# Patient Record
Sex: FEMALE | Race: WHITE | NOT HISPANIC OR LATINO | Employment: FULL TIME | URBAN - METROPOLITAN AREA
[De-identification: names, ages, dates, MRNs, and addresses within clinical notes are randomized per-mention and may not be internally consistent; named-entity substitution may affect disease eponyms.]

---

## 2023-11-13 ENCOUNTER — OFFICE VISIT (OUTPATIENT)
Dept: PRIMARY CARE | Facility: CLINIC | Age: 53
End: 2023-11-13
Payer: COMMERCIAL

## 2023-11-13 VITALS
HEIGHT: 69 IN | BODY MASS INDEX: 38.8 KG/M2 | OXYGEN SATURATION: 94 % | SYSTOLIC BLOOD PRESSURE: 136 MMHG | TEMPERATURE: 97.2 F | DIASTOLIC BLOOD PRESSURE: 72 MMHG | WEIGHT: 262 LBS | HEART RATE: 93 BPM

## 2023-11-13 DIAGNOSIS — H66.003 NON-RECURRENT ACUTE SUPPURATIVE OTITIS MEDIA OF BOTH EARS WITHOUT SPONTANEOUS RUPTURE OF TYMPANIC MEMBRANES: Primary | ICD-10-CM

## 2023-11-13 DIAGNOSIS — R05.1 ACUTE COUGH: ICD-10-CM

## 2023-11-13 PROCEDURE — 99213 OFFICE O/P EST LOW 20 MIN: CPT

## 2023-11-13 PROCEDURE — 1036F TOBACCO NON-USER: CPT

## 2023-11-13 RX ORDER — LOSARTAN POTASSIUM AND HYDROCHLOROTHIAZIDE 12.5; 5 MG/1; MG/1
TABLET ORAL EVERY 24 HOURS
COMMUNITY
End: 2024-02-16 | Stop reason: SDUPTHER

## 2023-11-13 RX ORDER — BENZONATATE 100 MG/1
100 CAPSULE ORAL 3 TIMES DAILY PRN
Qty: 30 CAPSULE | Refills: 0 | Status: SHIPPED | OUTPATIENT
Start: 2023-11-13 | End: 2023-11-23

## 2023-11-13 RX ORDER — EZETIMIBE AND SIMVASTATIN 10; 40 MG/1; MG/1
TABLET ORAL EVERY 24 HOURS
COMMUNITY
Start: 2023-02-15 | End: 2024-02-16 | Stop reason: SDUPTHER

## 2023-11-13 RX ORDER — METFORMIN HYDROCHLORIDE 500 MG/1
TABLET ORAL
COMMUNITY
Start: 2023-02-15 | End: 2024-02-16 | Stop reason: SDUPTHER

## 2023-11-13 RX ORDER — DOXYCYCLINE 100 MG/1
100 CAPSULE ORAL 2 TIMES DAILY
Qty: 14 CAPSULE | Refills: 0 | Status: SHIPPED | OUTPATIENT
Start: 2023-11-13 | End: 2023-11-20

## 2023-11-13 RX ORDER — PIOGLITAZONEHYDROCHLORIDE 45 MG/1
TABLET ORAL EVERY 24 HOURS
COMMUNITY
Start: 2023-02-15 | End: 2024-02-16 | Stop reason: SDUPTHER

## 2023-11-13 RX ORDER — DULAGLUTIDE 1.5 MG/.5ML
3 INJECTION, SOLUTION SUBCUTANEOUS
COMMUNITY
Start: 2022-10-27 | End: 2024-02-16 | Stop reason: SDUPTHER

## 2023-11-13 ASSESSMENT — ENCOUNTER SYMPTOMS
ABDOMINAL PAIN: 0
DIARRHEA: 0
MYALGIAS: 0
RHINORRHEA: 1
HEMOPTYSIS: 0
FEVER: 0
NECK PAIN: 0
HEARTBURN: 0
VOMITING: 0
SORE THROAT: 0
WEIGHT LOSS: 0
SHORTNESS OF BREATH: 0
WHEEZING: 0
COUGH: 1
CHILLS: 0
SWEATS: 0
HEADACHES: 1

## 2023-11-13 ASSESSMENT — PAIN SCALES - GENERAL: PAINLEVEL: 4

## 2023-11-13 ASSESSMENT — COPD QUESTIONNAIRES: COPD: 0

## 2023-11-13 NOTE — PROGRESS NOTES
Subjective   Patient ID: Elyse Dawson is a 53 y.o. female who presents for Ear Fullness (X 2 days), Cough, and Hoarseness.    Ear Fullness   There is pain in both ears. This is a new problem. Episode onset: 2-3 days. The problem occurs constantly. The problem has been unchanged. There has been no fever. The pain is at a severity of 5/10. The pain is moderate. Associated symptoms include coughing, headaches, hearing loss and rhinorrhea. Pertinent negatives include no abdominal pain, diarrhea, ear discharge, neck pain, rash, sore throat or vomiting. She has tried acetaminophen (mucinex) for the symptoms. The treatment provided mild relief. Her past medical history is significant for a chronic ear infection and a tympanostomy tube. There is no history of hearing loss.   Cough  This is a new problem. Episode onset: progressive over the last several days. The problem has been unchanged. The problem occurs every few minutes. The cough is Non-productive. Associated symptoms include ear congestion, ear pain, headaches, nasal congestion and rhinorrhea. Pertinent negatives include no chest pain, chills, fever, heartburn, hemoptysis, myalgias, postnasal drip, rash, sore throat, shortness of breath, sweats, weight loss or wheezing. Nothing aggravates the symptoms. Treatments tried: mucinex. The treatment provided mild relief. There is no history of asthma, bronchiectasis, bronchitis, COPD, emphysema, environmental allergies or pneumonia.            Review of Systems   Constitutional:  Negative for chills, fever and weight loss.   HENT:  Positive for ear pain, hearing loss and rhinorrhea. Negative for ear discharge, postnasal drip and sore throat.    Respiratory:  Positive for cough. Negative for hemoptysis, shortness of breath and wheezing.    Cardiovascular:  Negative for chest pain.   Gastrointestinal:  Negative for abdominal pain, diarrhea, heartburn and vomiting.   Musculoskeletal:  Negative for myalgias and neck pain.  "  Skin:  Negative for rash.   Allergic/Immunologic: Negative for environmental allergies.   Neurological:  Positive for headaches.       Objective   Blood Pressure 136/72 (BP Location: Left arm)   Pulse 93   Temperature 36.2 °C (97.2 °F) (Temporal)   Height 1.753 m (5' 9\")   Weight 119 kg (262 lb)   Oxygen Saturation 94%   Body Mass Index 38.69 kg/m²     Physical Exam  Vitals and nursing note reviewed.   Constitutional:       General: She is not in acute distress.     Appearance: Normal appearance. She is not ill-appearing or toxic-appearing.   HENT:      Right Ear: External ear normal. Decreased hearing noted. Tympanic membrane is erythematous and bulging.      Left Ear: External ear normal. Decreased hearing noted. Tympanic membrane is erythematous and bulging.      Nose: Nasal tenderness, mucosal edema, congestion and rhinorrhea present. Rhinorrhea is clear.      Right Turbinates: Swollen.      Left Turbinates: Swollen.      Right Sinus: Maxillary sinus tenderness and frontal sinus tenderness present.      Left Sinus: Maxillary sinus tenderness and frontal sinus tenderness present.      Mouth/Throat:      Lips: Pink.      Mouth: Mucous membranes are moist.      Pharynx: Oropharynx is clear. Uvula midline. Posterior oropharyngeal erythema present. No pharyngeal swelling, oropharyngeal exudate or uvula swelling.   Neck:      Trachea: Trachea and phonation normal.   Cardiovascular:      Rate and Rhythm: Normal rate and regular rhythm.      Heart sounds: Normal heart sounds, S1 normal and S2 normal.   Pulmonary:      Effort: Pulmonary effort is normal.      Breath sounds: Normal breath sounds and air entry. No decreased breath sounds, wheezing, rhonchi or rales.   Musculoskeletal:      Cervical back: Full passive range of motion without pain, normal range of motion and neck supple.   Lymphadenopathy:      Cervical: No cervical adenopathy.   Skin:     General: Skin is warm and dry.   Neurological:      General: " No focal deficit present.      Mental Status: She is alert.   Psychiatric:         Behavior: Behavior is cooperative.               Assessment/Plan   Problem List Items Addressed This Visit    None  Visit Diagnoses       Diagnosis Codes    Non-recurrent acute suppurative otitis media of both ears without spontaneous rupture of tympanic membranes    -  Primary H66.003    Relevant Medications    doxycycline (Vibramycin) 100 mg capsule    Start antibiotics as directed and finish full course.  Reviewed risks, side effects, and expected treatment course.  May use OTC tylenol/ibuprofen, nasal or oral decongestants for symptom control as discussed.   May use saline nasal spray as discussed to thin mucus and relieve nasal symptoms.  Call if not rapidly improving over the next 5-7 days.      Acute cough     R05.1    Relevant Medications    benzonatate (Tessalon) 100 mg capsule    Acute. Begin tessalon perles as prescribed.  Indications, administration, and side effects discussed.    May continue with OTC medications for symptom relief as discussed.   Follow up in 1 week if symptoms persist.

## 2023-12-05 ENCOUNTER — OFFICE VISIT (OUTPATIENT)
Dept: PRIMARY CARE | Facility: CLINIC | Age: 53
End: 2023-12-05
Payer: COMMERCIAL

## 2023-12-05 VITALS
OXYGEN SATURATION: 97 % | TEMPERATURE: 97.2 F | HEART RATE: 85 BPM | DIASTOLIC BLOOD PRESSURE: 72 MMHG | BODY MASS INDEX: 39.43 KG/M2 | WEIGHT: 267 LBS | SYSTOLIC BLOOD PRESSURE: 126 MMHG

## 2023-12-05 DIAGNOSIS — H69.93 EUSTACHIAN TUBE DYSFUNCTION, BILATERAL: Primary | ICD-10-CM

## 2023-12-05 PROCEDURE — 99212 OFFICE O/P EST SF 10 MIN: CPT | Performed by: FAMILY MEDICINE

## 2023-12-05 PROCEDURE — 1036F TOBACCO NON-USER: CPT | Performed by: FAMILY MEDICINE

## 2023-12-05 ASSESSMENT — PATIENT HEALTH QUESTIONNAIRE - PHQ9
1. LITTLE INTEREST OR PLEASURE IN DOING THINGS: NOT AT ALL
2. FEELING DOWN, DEPRESSED OR HOPELESS: NOT AT ALL
SUM OF ALL RESPONSES TO PHQ9 QUESTIONS 1 AND 2: 0

## 2023-12-05 ASSESSMENT — PAIN SCALES - GENERAL: PAINLEVEL: 3

## 2023-12-05 NOTE — PROGRESS NOTES
Subjective   Patient ID: Elyse Dawson is a 53 y.o. female who presents for Sick Visit (Pt c/o left ear pain x 2 weeks. ).    HPI   Elyse patient presents with left ear pain for the past 2 weeks.  She was last here about a month ago and diagnosed with bilateral otitis media and a cough.  She was placed on doxycycline and Tessalon. Her right ear feels normal.  She still has some pressure in her left ear.  She has chronic nasal drainage and a dry cough.  She has been on allergy medicines in the past and nothing has worked. He has not needed to take anything for discomfort.    Review of Systems  Denies fever or chills.  She does not feel ill.  He is going about her normal activities.    Objective   /72   Pulse 85   Temp 36.2 °C (97.2 °F)   Wt 121 kg (267 lb)   SpO2 97%   BMI 39.43 kg/m²     Physical Exam  She is alert and in no acute distress. Right TM is clear.  No evidence of a rupture.  Left TM has some fluid behind it.  No evidence of infection.    Nose shows clear rhinorrhea.  Throat is noninjected and without exudate.  Neck is supple without adenopathy    Assessment/Plan   Diagnoses and all orders for this visit:  Eustachian tube dysfunction, bilateral  Recommend insufflation a couple times a day.  She was reassured no further antibiotics are necessary.  She should follow-up routinely.

## 2024-01-31 ENCOUNTER — TELEPHONE (OUTPATIENT)
Dept: PRIMARY CARE | Facility: CLINIC | Age: 54
End: 2024-01-31
Payer: COMMERCIAL

## 2024-01-31 DIAGNOSIS — E11.9 TYPE 2 DIABETES MELLITUS WITHOUT COMPLICATION, UNSPECIFIED WHETHER LONG TERM INSULIN USE (MULTI): ICD-10-CM

## 2024-01-31 DIAGNOSIS — E78.49 OTHER HYPERLIPIDEMIA: ICD-10-CM

## 2024-01-31 DIAGNOSIS — I10 ESSENTIAL (PRIMARY) HYPERTENSION: ICD-10-CM

## 2024-01-31 DIAGNOSIS — Z00.00 GENERAL MEDICAL EXAM: ICD-10-CM

## 2024-01-31 NOTE — TELEPHONE ENCOUNTER
PLEASE PUT ORDERS FOR LAB WORK IN [facility].  PATIENT HAS A CPE ON 2-16-24 AND WOULD LIKE TO GO THIS FRIDAY FOR BLOOD WORK

## 2024-02-02 ENCOUNTER — LAB (OUTPATIENT)
Dept: LAB | Facility: LAB | Age: 54
End: 2024-02-02
Payer: COMMERCIAL

## 2024-02-02 DIAGNOSIS — E78.49 OTHER HYPERLIPIDEMIA: ICD-10-CM

## 2024-02-02 DIAGNOSIS — Z00.00 GENERAL MEDICAL EXAM: ICD-10-CM

## 2024-02-02 DIAGNOSIS — I10 ESSENTIAL (PRIMARY) HYPERTENSION: ICD-10-CM

## 2024-02-02 DIAGNOSIS — E11.9 TYPE 2 DIABETES MELLITUS WITHOUT COMPLICATION, UNSPECIFIED WHETHER LONG TERM INSULIN USE (MULTI): ICD-10-CM

## 2024-02-02 LAB
ALBUMIN SERPL-MCNC: 4.5 G/DL (ref 3.5–5)
ALP BLD-CCNC: 87 U/L (ref 35–125)
ALT SERPL-CCNC: 16 U/L (ref 5–40)
ANION GAP SERPL CALC-SCNC: 13 MMOL/L
APPEARANCE UR: CLEAR
AST SERPL-CCNC: 21 U/L (ref 5–40)
BACTERIA #/AREA URNS AUTO: ABNORMAL /HPF
BASOPHILS # BLD AUTO: 0.02 X10*3/UL (ref 0–0.1)
BASOPHILS NFR BLD AUTO: 0.2 %
BILIRUB SERPL-MCNC: 0.4 MG/DL (ref 0.1–1.2)
BILIRUB UR STRIP.AUTO-MCNC: NEGATIVE MG/DL
BUN SERPL-MCNC: 15 MG/DL (ref 8–25)
CALCIUM SERPL-MCNC: 9.8 MG/DL (ref 8.5–10.4)
CHLORIDE SERPL-SCNC: 101 MMOL/L (ref 97–107)
CHOLEST SERPL-MCNC: 160 MG/DL (ref 133–200)
CHOLEST/HDLC SERPL: 2.3 {RATIO}
CO2 SERPL-SCNC: 25 MMOL/L (ref 24–31)
COLOR UR: ABNORMAL
CREAT SERPL-MCNC: 0.7 MG/DL (ref 0.4–1.6)
CREAT UR-MCNC: 79.8 MG/DL
EGFRCR SERPLBLD CKD-EPI 2021: >90 ML/MIN/1.73M*2
EOSINOPHIL # BLD AUTO: 0.21 X10*3/UL (ref 0–0.7)
EOSINOPHIL NFR BLD AUTO: 2.3 %
ERYTHROCYTE [DISTWIDTH] IN BLOOD BY AUTOMATED COUNT: 13.3 % (ref 11.5–14.5)
EST. AVERAGE GLUCOSE BLD GHB EST-MCNC: 163 MG/DL
GLUCOSE SERPL-MCNC: 152 MG/DL (ref 65–99)
GLUCOSE UR STRIP.AUTO-MCNC: NORMAL MG/DL
HBA1C MFR BLD: 7.3 %
HCT VFR BLD AUTO: 40.8 % (ref 36–46)
HDLC SERPL-MCNC: 70 MG/DL
HGB BLD-MCNC: 13.1 G/DL (ref 12–16)
IMM GRANULOCYTES # BLD AUTO: 0.03 X10*3/UL (ref 0–0.7)
IMM GRANULOCYTES NFR BLD AUTO: 0.3 % (ref 0–0.9)
KETONES UR STRIP.AUTO-MCNC: NEGATIVE MG/DL
LDLC SERPL CALC-MCNC: 70 MG/DL (ref 65–130)
LEUKOCYTE ESTERASE UR QL STRIP.AUTO: ABNORMAL
LYMPHOCYTES # BLD AUTO: 1.91 X10*3/UL (ref 1.2–4.8)
LYMPHOCYTES NFR BLD AUTO: 20.9 %
MCH RBC QN AUTO: 28.4 PG (ref 26–34)
MCHC RBC AUTO-ENTMCNC: 32.1 G/DL (ref 32–36)
MCV RBC AUTO: 88 FL (ref 80–100)
MICROALBUMIN UR-MCNC: <12 MG/L (ref 0–23)
MICROALBUMIN/CREAT UR: NORMAL MG/G{CREAT}
MONOCYTES # BLD AUTO: 0.52 X10*3/UL (ref 0.1–1)
MONOCYTES NFR BLD AUTO: 5.7 %
MUCOUS THREADS #/AREA URNS AUTO: ABNORMAL /LPF
NEUTROPHILS # BLD AUTO: 6.43 X10*3/UL (ref 1.2–7.7)
NEUTROPHILS NFR BLD AUTO: 70.6 %
NITRITE UR QL STRIP.AUTO: NEGATIVE
NRBC BLD-RTO: 0 /100 WBCS (ref 0–0)
PH UR STRIP.AUTO: 5.5 [PH]
PLATELET # BLD AUTO: 243 X10*3/UL (ref 150–450)
POTASSIUM SERPL-SCNC: 4.2 MMOL/L (ref 3.4–5.1)
PROT SERPL-MCNC: 6.7 G/DL (ref 5.9–7.9)
PROT UR STRIP.AUTO-MCNC: NEGATIVE MG/DL
RBC # BLD AUTO: 4.62 X10*6/UL (ref 4–5.2)
RBC # UR STRIP.AUTO: NEGATIVE /UL
RBC #/AREA URNS AUTO: ABNORMAL /HPF
SODIUM SERPL-SCNC: 139 MMOL/L (ref 133–145)
SP GR UR STRIP.AUTO: 1.02
SQUAMOUS #/AREA URNS AUTO: ABNORMAL /HPF
TRIGL SERPL-MCNC: 100 MG/DL (ref 40–150)
TSH SERPL DL<=0.05 MIU/L-ACNC: 2.5 MIU/L (ref 0.27–4.2)
UROBILINOGEN UR STRIP.AUTO-MCNC: NORMAL MG/DL
WBC # BLD AUTO: 9.1 X10*3/UL (ref 4.4–11.3)
WBC #/AREA URNS AUTO: ABNORMAL /HPF

## 2024-02-02 PROCEDURE — 80053 COMPREHEN METABOLIC PANEL: CPT

## 2024-02-02 PROCEDURE — 81001 URINALYSIS AUTO W/SCOPE: CPT

## 2024-02-02 PROCEDURE — 83036 HEMOGLOBIN GLYCOSYLATED A1C: CPT

## 2024-02-02 PROCEDURE — 36415 COLL VENOUS BLD VENIPUNCTURE: CPT

## 2024-02-02 PROCEDURE — 80061 LIPID PANEL: CPT

## 2024-02-02 PROCEDURE — 82043 UR ALBUMIN QUANTITATIVE: CPT

## 2024-02-02 PROCEDURE — 82570 ASSAY OF URINE CREATININE: CPT

## 2024-02-02 PROCEDURE — 84443 ASSAY THYROID STIM HORMONE: CPT

## 2024-02-02 PROCEDURE — 85025 COMPLETE CBC W/AUTO DIFF WBC: CPT

## 2024-02-16 ENCOUNTER — OFFICE VISIT (OUTPATIENT)
Dept: PRIMARY CARE | Facility: CLINIC | Age: 54
End: 2024-02-16
Payer: COMMERCIAL

## 2024-02-16 ENCOUNTER — TELEPHONE (OUTPATIENT)
Dept: PRIMARY CARE | Facility: CLINIC | Age: 54
End: 2024-02-16

## 2024-02-16 VITALS
SYSTOLIC BLOOD PRESSURE: 126 MMHG | DIASTOLIC BLOOD PRESSURE: 70 MMHG | HEART RATE: 74 BPM | HEIGHT: 69 IN | OXYGEN SATURATION: 98 % | BODY MASS INDEX: 38.66 KG/M2 | TEMPERATURE: 97.4 F | WEIGHT: 261 LBS

## 2024-02-16 DIAGNOSIS — E11.9 TYPE 2 DIABETES MELLITUS WITHOUT COMPLICATION, UNSPECIFIED WHETHER LONG TERM INSULIN USE (MULTI): ICD-10-CM

## 2024-02-16 DIAGNOSIS — E66.01 MORBID OBESITY (MULTI): ICD-10-CM

## 2024-02-16 DIAGNOSIS — I10 ESSENTIAL (PRIMARY) HYPERTENSION: ICD-10-CM

## 2024-02-16 DIAGNOSIS — Z12.11 ENCOUNTER FOR SCREENING FOR MALIGNANT NEOPLASM OF COLON: Primary | ICD-10-CM

## 2024-02-16 DIAGNOSIS — Z01.419 WELL FEMALE EXAM WITH ROUTINE GYNECOLOGICAL EXAM: ICD-10-CM

## 2024-02-16 DIAGNOSIS — Z12.31 VISIT FOR SCREENING MAMMOGRAM: ICD-10-CM

## 2024-02-16 DIAGNOSIS — E78.49 OTHER HYPERLIPIDEMIA: ICD-10-CM

## 2024-02-16 DIAGNOSIS — Z00.00 ROUTINE MEDICAL EXAM: ICD-10-CM

## 2024-02-16 PROCEDURE — 88175 CYTOPATH C/V AUTO FLUID REDO: CPT

## 2024-02-16 PROCEDURE — 3048F LDL-C <100 MG/DL: CPT | Performed by: FAMILY MEDICINE

## 2024-02-16 PROCEDURE — 99396 PREV VISIT EST AGE 40-64: CPT | Performed by: FAMILY MEDICINE

## 2024-02-16 PROCEDURE — 3062F POS MACROALBUMINURIA REV: CPT | Performed by: FAMILY MEDICINE

## 2024-02-16 PROCEDURE — 3051F HG A1C>EQUAL 7.0%<8.0%: CPT | Performed by: FAMILY MEDICINE

## 2024-02-16 PROCEDURE — 99213 OFFICE O/P EST LOW 20 MIN: CPT | Performed by: FAMILY MEDICINE

## 2024-02-16 PROCEDURE — 1036F TOBACCO NON-USER: CPT | Performed by: FAMILY MEDICINE

## 2024-02-16 PROCEDURE — 3074F SYST BP LT 130 MM HG: CPT | Performed by: FAMILY MEDICINE

## 2024-02-16 PROCEDURE — 3078F DIAST BP <80 MM HG: CPT | Performed by: FAMILY MEDICINE

## 2024-02-16 RX ORDER — EZETIMIBE AND SIMVASTATIN 10; 40 MG/1; MG/1
1 TABLET ORAL NIGHTLY
Qty: 90 TABLET | Refills: 3 | Status: SHIPPED | OUTPATIENT
Start: 2024-02-16 | End: 2024-02-29 | Stop reason: SDUPTHER

## 2024-02-16 RX ORDER — PIOGLITAZONEHYDROCHLORIDE 45 MG/1
45 TABLET ORAL DAILY
Qty: 90 TABLET | Refills: 3 | Status: SHIPPED | OUTPATIENT
Start: 2024-02-16 | End: 2024-05-31 | Stop reason: SDUPTHER

## 2024-02-16 RX ORDER — DULAGLUTIDE 1.5 MG/.5ML
3 INJECTION, SOLUTION SUBCUTANEOUS
Qty: 2 ML | Refills: 11 | Status: SHIPPED | OUTPATIENT
Start: 2024-02-16 | End: 2024-02-21 | Stop reason: RX

## 2024-02-16 RX ORDER — LOSARTAN POTASSIUM AND HYDROCHLOROTHIAZIDE 12.5; 5 MG/1; MG/1
1 TABLET ORAL DAILY
Qty: 90 TABLET | Refills: 3 | Status: SHIPPED | OUTPATIENT
Start: 2024-02-16 | End: 2024-02-29 | Stop reason: SDUPTHER

## 2024-02-16 RX ORDER — METFORMIN HYDROCHLORIDE 500 MG/1
TABLET ORAL
Qty: 450 TABLET | Refills: 3 | Status: SHIPPED | OUTPATIENT
Start: 2024-02-16 | End: 2024-05-31 | Stop reason: SDUPTHER

## 2024-02-16 ASSESSMENT — PAIN SCALES - GENERAL: PAINLEVEL: 3

## 2024-02-16 NOTE — PROGRESS NOTES
Subjective   Patient ID: Elyse Dawson is a 53 y.o. female who presents for Annual Exam (EKG 2023. Last pap 2019 nl/neg. Refuses colon cancer screening.).    HPI The patient i presents for a comprehensive physical exam. PMH, PSH, family history and social history were reviewed and updated. .   Patient receives her gynecology care in this office. Her Pap was last done in 2019.  Pt has DM: controlled, HbA1C is 7.4%, urine micro normal, compliant with medications, follows a low carb diet, no exercise routine at this time but plans to start riding a bicycle, DM eye exam is UTD, DM foot exam is UTD (sees podiatry). She is on Trulicity 1.5 milligrams weekly, pioglitazone 45 milligrams daily, metformin 500 milligrams 2 pills a.m., 3 pills evening. She is also on Januvia 100 milligrams daily.  Pt has HTN: blood pressure not at goal, admits to being flustered today, has a blood pressure machine at home but has not used it for a while, compliant with medications, follows a low salt diet, exercise as stated above. She is on hydrochlorothiazide 12.5 milligrams daily, losartan 50 milligrams daily.  Pt has Hyperlipidemia: controlled, lipid panel normal, compliant with medications, follows a low fat diet, exercise as stated above. She is on Zocor /Zetia 40/10 daily.  Patient had her tetanus shot in 2018. She had her shingles immunization in 2022. She states she has had 1 of her pneumococcal immunizations in the past. She does not recall a 2nd pneumococcal immunization. She has been immunized against coronavirus x3. She refuses influenza immunization.   Patient has never had a colonoscopy. Her mammogram is due in June of 2023.   Patient has never used tobacco and she has less than 1 drink per week.    Review of Systems  GENERAL: denies lack of energy, unexplained weight gain or weight loss, loss of appetite, fever, night sweats.  HEENT: denies difficulty with hearing, sinus problems, runny nose, post-nasal drip, ringing in ears,  "mouth sores, loose teeth, ear pain, nosebleeds, sore throat, facial pain or numbness.  CV: denies irregular heartbeat, racing heart, chest pains, swelling of feet or legs, pain in legs with walking.  RESPIRATORY: denies shortness of breath, prolonged cough, wheezing, sputum production, prior tuberculosis, pleurisy, oxygen at home, coughing up blood.  GI: denies heartburn, constipation, intolerance to certain foods, diarrhea, abdominal pain, difficulty swallowing, nausea, vomiting, blood in stools, unexplained change in bowel habits, incontinence.  : denies painful urination, frequent urination, urgency, bladder problems.  MS: denies aching muscles, swelling of joints, joint deformities, back pain,  INTEGUMENT: denies persistent rash, itching, new skin lesion, change in existing skin lesion, hair loss or increase, breast changes.  NEUROLOGIC: denies frequent headaches, double vision, weakness, change in sensation, problems with walking or balance, dizziness, tremor, loss of consciousness, uncontrolled motions, episodes of visual loss.  PSYCHIATRIC: denies insomnia, irritability, depression, anxiety, recurrent bad thoughts.  ENDOCRINOLOGIC: denies intolerance to heat or cold, menstrual irregularities, frequent hunger/urination/thirst.  HEMATOLOGIC: denies easy bleeding, easy bruising, anemia, leukemia, unexplained swollen areas.  ALLERGIC/IMMUNOLOGIC: denies seasonal allergies, hay fever symptoms, itching, frequent infections.  Objective   /70   Pulse 74   Temp 36.3 °C (97.4 °F)   Ht 1.753 m (5' 9\")   Wt 118 kg (261 lb)   SpO2 98%   BMI 38.54 kg/m²     Physical Exam  General appearance: Well developed, obese, in no acute distress.  Skin: Inspection of the skin reveals no rashes, ulceration, or petechiae.  HEENT: The sclerae were anicteric and conjunctivae were pink and moist. Extraocular movements were intact and pupils were equal, round, and reactive to light with normal accommodation. External " inspection of the ears and nose showed no scars, lesions, or masses. EACs clear, TMs translucent, ossicles normal appearance, hearing intact. Nasal mucosa non-inflamed, turbinates normal. Lips, teeth, and gums showed normal mucosa. The oral mucosa, hard and soft palate, tongue and posterior pharynx were normal.  Neck: Supple and symmetric. There was no thyroid enlargement, and no tenderness, or masses were felt.   Lungs: Auscultation of the lungs revealed normal breath sounds without any other adventitious sounds.  Cardiovascular: There was a regular rate and rhythm without any murmurs, gallops, or rubs. There were no carotid bruits. Peripheral pulses were 2+ and symmetric. Brisk capillary refill.  Abdomen: Soft and nontender with normal bowel sounds. The liver was not enlarged or tender. The spleen was not palpable. There was no umbilical hernia noted. No ascites was noted.  Lymph nodes: No lymphadenopathy was appreciated in the neck.  Musculoskeletal: There was no tenderness or effusions noted. Muscle strength and tone were normal.   Extremities: No cyanosis, clubbing, or edema.  Neurologic: Alert and oriented x 3. Normal affect. Normal deep tendon reflexes with no pathological reflexes. Sensation to touch was normal. She declines the influenza vaccination.   Genitourinary: Normal external genitalia. A bimanual exam did not reveal uterine enlargement or adnexal tenderness.  Rectal: Deferred.  Breasts: Symmetric, no masses palpated.  Assessment/Plan   Problem List Items Addressed This Visit             ICD-10-CM    Essential (primary) hypertension  Stable.  Continue on losartan/hydrochlorothiazide. I10    Relevant Medications    losartan-hydrochlorothiazide (Hyzaar) 50-12.5 mg tablet    Other hyperlipidemia  Stable.  Continue on Vytorin. E78.49    Relevant Medications    ezetimibe-simvastatin (Vytorin 10-40) 10-40 mg tablet    Type 2 diabetes mellitus without complications (CMS/HCC)   E11.9    Relevant Medications     dulaglutide (Trulicity) 1.5 mg/0.5 mL pen injector injection    metFORMIN (Glucophage) 500 mg tablet    pioglitazone (Actos) 45 mg tablet    SITagliptin phosphate (Januvia) 100 mg tablet     Other Visit Diagnoses         Codes    Encounter for screening for malignant neoplasm of colon    -  Primary   Needs referral.  Patient referred for colonoscopy. Z12.11    Relevant Orders    Referral to Gastroenterology    Routine medical exam    Normal exam. Z00.00    Visit for screening mammogram    Normal exam.  Requisition for mammogram. Z12.31    Relevant Orders    BI mammo bilateral screening tomosynthesis    Morbid obesity (CMS/HCC)    Needs better control.  Patient will attempt to cut her carbohydrate intake. E66.01    Well female exam with routine gynecological exam    Normal exam. Z01.419

## 2024-02-19 DIAGNOSIS — E11.9 TYPE 2 DIABETES MELLITUS WITHOUT COMPLICATION, UNSPECIFIED WHETHER LONG TERM INSULIN USE (MULTI): ICD-10-CM

## 2024-02-19 RX ORDER — SITAGLIPTIN 100 MG/1
100 TABLET, FILM COATED ORAL DAILY
Qty: 90 TABLET | Refills: 3 | OUTPATIENT
Start: 2024-02-19

## 2024-02-21 DIAGNOSIS — E11.9 TYPE 2 DIABETES MELLITUS WITHOUT COMPLICATION, WITHOUT LONG-TERM CURRENT USE OF INSULIN (MULTI): Primary | ICD-10-CM

## 2024-02-22 RX ORDER — DULAGLUTIDE 3 MG/.5ML
3 INJECTION, SOLUTION SUBCUTANEOUS
Qty: 2 ML | Refills: 5 | Status: SHIPPED | OUTPATIENT
Start: 2024-02-22 | End: 2024-02-29 | Stop reason: SDUPTHER

## 2024-02-28 LAB
CYTOLOGY CMNT CVX/VAG CYTO-IMP: NORMAL
LAB AP HPV GENOTYPE QUESTION: YES
LAB AP HPV HR: NORMAL
LABORATORY COMMENT REPORT: NORMAL
LMP START DATE: NORMAL
MENSTRUAL HX REPORTED: NORMAL
PATH REPORT.TOTAL CANCER: NORMAL

## 2024-02-29 ENCOUNTER — TELEPHONE (OUTPATIENT)
Dept: PRIMARY CARE | Facility: CLINIC | Age: 54
End: 2024-02-29
Payer: COMMERCIAL

## 2024-02-29 DIAGNOSIS — E78.49 OTHER HYPERLIPIDEMIA: ICD-10-CM

## 2024-02-29 DIAGNOSIS — I10 ESSENTIAL (PRIMARY) HYPERTENSION: ICD-10-CM

## 2024-02-29 DIAGNOSIS — E11.9 TYPE 2 DIABETES MELLITUS WITHOUT COMPLICATION, UNSPECIFIED WHETHER LONG TERM INSULIN USE (MULTI): ICD-10-CM

## 2024-02-29 DIAGNOSIS — E11.9 TYPE 2 DIABETES MELLITUS WITHOUT COMPLICATION, WITHOUT LONG-TERM CURRENT USE OF INSULIN (MULTI): ICD-10-CM

## 2024-02-29 RX ORDER — LOSARTAN POTASSIUM AND HYDROCHLOROTHIAZIDE 12.5; 5 MG/1; MG/1
1 TABLET ORAL DAILY
Qty: 90 TABLET | Refills: 3 | Status: SHIPPED | OUTPATIENT
Start: 2024-02-29 | End: 2025-02-28

## 2024-02-29 RX ORDER — DULAGLUTIDE 3 MG/.5ML
3 INJECTION, SOLUTION SUBCUTANEOUS
Qty: 2 ML | Refills: 5 | Status: SHIPPED | OUTPATIENT
Start: 2024-02-29 | End: 2024-05-31 | Stop reason: SDUPTHER

## 2024-02-29 RX ORDER — EZETIMIBE AND SIMVASTATIN 10; 40 MG/1; MG/1
1 TABLET ORAL NIGHTLY
Qty: 90 TABLET | Refills: 3 | Status: SHIPPED | OUTPATIENT
Start: 2024-02-29 | End: 2025-02-28

## 2024-02-29 NOTE — TELEPHONE ENCOUNTER
Patient is calling in for a refill of Losartan hydrochlorothiazide 50-12.5 mg tabs, Januvia 100 mg tabs, Ezetemibe-Simvastatin 10-40 mg tabs and Trulicity 3/0.5 ml.  [facility] to [facility] [place].

## 2024-03-04 ENCOUNTER — TELEPHONE (OUTPATIENT)
Dept: PRIMARY CARE | Facility: CLINIC | Age: 54
End: 2024-03-04
Payer: COMMERCIAL

## 2024-03-04 NOTE — TELEPHONE ENCOUNTER
RX REQUEST  [facility]   LOSARTAN  JANUVIA  Hydrochlorothiazide  TRULICITY   EZETIMIBE     THEY WERE CALLED INTO WRONG PHARMACY   90 DAY SUPPLY

## 2024-04-15 DIAGNOSIS — I10 ESSENTIAL (PRIMARY) HYPERTENSION: ICD-10-CM

## 2024-04-16 RX ORDER — HYDROCHLOROTHIAZIDE 12.5 MG/1
12.5 CAPSULE ORAL DAILY
Qty: 90 CAPSULE | Refills: 3 | Status: SHIPPED | OUTPATIENT
Start: 2024-04-16

## 2024-05-31 ENCOUNTER — TELEPHONE (OUTPATIENT)
Dept: PRIMARY CARE | Facility: CLINIC | Age: 54
End: 2024-05-31
Payer: COMMERCIAL

## 2024-05-31 DIAGNOSIS — E11.9 TYPE 2 DIABETES MELLITUS WITHOUT COMPLICATION, WITHOUT LONG-TERM CURRENT USE OF INSULIN (MULTI): ICD-10-CM

## 2024-05-31 DIAGNOSIS — E11.9 TYPE 2 DIABETES MELLITUS WITHOUT COMPLICATION, UNSPECIFIED WHETHER LONG TERM INSULIN USE (MULTI): ICD-10-CM

## 2024-05-31 RX ORDER — PIOGLITAZONEHYDROCHLORIDE 45 MG/1
45 TABLET ORAL DAILY
Qty: 90 TABLET | Refills: 0 | Status: SHIPPED | OUTPATIENT
Start: 2024-05-31 | End: 2025-05-31

## 2024-05-31 RX ORDER — METFORMIN HYDROCHLORIDE 500 MG/1
TABLET ORAL
Qty: 450 TABLET | Refills: 0 | Status: SHIPPED | OUTPATIENT
Start: 2024-05-31 | End: 2024-06-05

## 2024-05-31 RX ORDER — DULAGLUTIDE 3 MG/.5ML
3 INJECTION, SOLUTION SUBCUTANEOUS
Qty: 2 ML | Refills: 0 | Status: SHIPPED | OUTPATIENT
Start: 2024-06-02

## 2024-05-31 NOTE — TELEPHONE ENCOUNTER
Patient called RX Line:    pioglitazone (Actos) 45 mg tablet  metFORMIN (Glucophage) 500 mg tablet  dulaglutide (Trulicity) 3 mg/0.5 mL pen injector (for this patient states her insurance only covers a 90 day supply)    [facility] ([address] in [place])    Patient phone: [phone]

## 2024-06-05 DIAGNOSIS — E11.9 TYPE 2 DIABETES MELLITUS WITHOUT COMPLICATION, UNSPECIFIED WHETHER LONG TERM INSULIN USE (MULTI): ICD-10-CM

## 2024-06-05 RX ORDER — METFORMIN HYDROCHLORIDE 500 MG/1
TABLET ORAL
Qty: 450 TABLET | Refills: 3 | Status: SHIPPED | OUTPATIENT
Start: 2024-06-05

## 2024-08-09 ENCOUNTER — LAB (OUTPATIENT)
Dept: LAB | Facility: LAB | Age: 54
End: 2024-08-09
Payer: COMMERCIAL

## 2024-08-09 DIAGNOSIS — E11.9 TYPE 2 DIABETES MELLITUS WITHOUT COMPLICATION, UNSPECIFIED WHETHER LONG TERM INSULIN USE (MULTI): ICD-10-CM

## 2024-08-09 LAB
ALBUMIN SERPL-MCNC: 4.1 G/DL (ref 3.5–5)
ALP BLD-CCNC: 104 U/L (ref 35–125)
ALT SERPL-CCNC: 32 U/L (ref 5–40)
ANION GAP SERPL CALC-SCNC: 11 MMOL/L
AST SERPL-CCNC: 33 U/L (ref 5–40)
BILIRUB SERPL-MCNC: 0.3 MG/DL (ref 0.1–1.2)
BUN SERPL-MCNC: 13 MG/DL (ref 8–25)
CALCIUM SERPL-MCNC: 10.1 MG/DL (ref 8.5–10.4)
CHLORIDE SERPL-SCNC: 98 MMOL/L (ref 97–107)
CO2 SERPL-SCNC: 27 MMOL/L (ref 24–31)
CREAT SERPL-MCNC: 0.7 MG/DL (ref 0.4–1.6)
EGFRCR SERPLBLD CKD-EPI 2021: >90 ML/MIN/1.73M*2
EST. AVERAGE GLUCOSE BLD GHB EST-MCNC: 269 MG/DL
GLUCOSE SERPL-MCNC: 272 MG/DL (ref 65–99)
HBA1C MFR BLD: 11 %
POTASSIUM SERPL-SCNC: 4.3 MMOL/L (ref 3.4–5.1)
PROT SERPL-MCNC: 6.6 G/DL (ref 5.9–7.9)
SODIUM SERPL-SCNC: 136 MMOL/L (ref 133–145)

## 2024-08-09 PROCEDURE — 80053 COMPREHEN METABOLIC PANEL: CPT

## 2024-08-09 PROCEDURE — 83036 HEMOGLOBIN GLYCOSYLATED A1C: CPT

## 2024-08-16 ENCOUNTER — APPOINTMENT (OUTPATIENT)
Dept: PRIMARY CARE | Facility: CLINIC | Age: 54
End: 2024-08-16
Payer: COMMERCIAL

## 2024-08-22 ENCOUNTER — APPOINTMENT (OUTPATIENT)
Dept: PRIMARY CARE | Facility: CLINIC | Age: 54
End: 2024-08-22
Payer: COMMERCIAL

## 2024-08-22 VITALS
HEART RATE: 94 BPM | TEMPERATURE: 97.7 F | OXYGEN SATURATION: 95 % | BODY MASS INDEX: 39.87 KG/M2 | WEIGHT: 270 LBS | SYSTOLIC BLOOD PRESSURE: 142 MMHG | DIASTOLIC BLOOD PRESSURE: 70 MMHG

## 2024-08-22 DIAGNOSIS — I10 ESSENTIAL (PRIMARY) HYPERTENSION: ICD-10-CM

## 2024-08-22 DIAGNOSIS — Z00.00 WELL ADULT EXAM: ICD-10-CM

## 2024-08-22 DIAGNOSIS — E11.9 TYPE 2 DIABETES MELLITUS WITHOUT COMPLICATION, UNSPECIFIED WHETHER LONG TERM INSULIN USE (MULTI): Primary | ICD-10-CM

## 2024-08-22 PROCEDURE — 1036F TOBACCO NON-USER: CPT | Performed by: FAMILY MEDICINE

## 2024-08-22 PROCEDURE — 3062F POS MACROALBUMINURIA REV: CPT | Performed by: FAMILY MEDICINE

## 2024-08-22 PROCEDURE — 3046F HEMOGLOBIN A1C LEVEL >9.0%: CPT | Performed by: FAMILY MEDICINE

## 2024-08-22 PROCEDURE — 3048F LDL-C <100 MG/DL: CPT | Performed by: FAMILY MEDICINE

## 2024-08-22 PROCEDURE — 3078F DIAST BP <80 MM HG: CPT | Performed by: FAMILY MEDICINE

## 2024-08-22 PROCEDURE — 4010F ACE/ARB THERAPY RXD/TAKEN: CPT | Performed by: FAMILY MEDICINE

## 2024-08-22 PROCEDURE — 99214 OFFICE O/P EST MOD 30 MIN: CPT | Performed by: FAMILY MEDICINE

## 2024-08-22 PROCEDURE — 3077F SYST BP >= 140 MM HG: CPT | Performed by: FAMILY MEDICINE

## 2024-08-22 RX ORDER — LOSARTAN POTASSIUM 50 MG/1
1 TABLET ORAL
COMMUNITY
Start: 2023-11-16 | End: 2024-08-22 | Stop reason: SDUPTHER

## 2024-08-22 RX ORDER — LOSARTAN POTASSIUM 100 MG/1
100 TABLET ORAL
Qty: 90 TABLET | Refills: 3 | Status: SHIPPED | OUTPATIENT
Start: 2024-08-22 | End: 2025-08-22

## 2024-08-22 ASSESSMENT — PATIENT HEALTH QUESTIONNAIRE - PHQ9
SUM OF ALL RESPONSES TO PHQ9 QUESTIONS 1 AND 2: 0
2. FEELING DOWN, DEPRESSED OR HOPELESS: NOT AT ALL
1. LITTLE INTEREST OR PLEASURE IN DOING THINGS: NOT AT ALL

## 2024-08-22 ASSESSMENT — PAIN SCALES - GENERAL: PAINLEVEL: 0-NO PAIN

## 2024-08-22 NOTE — PROGRESS NOTES
Subjective   Patient ID: Elyse Dawson is a 54 y.o. female who presents for Diabetes.    HPI here for multiple issues.  t has DM: controlled, HbA1C is 11.0%, it was 7.3 about 6 months ago.  She has had trouble obtaining her Trulicity for the past 3 months.  This is likely the reason why her A1c has increased.  Patient rides her bicycle.  DM eye exam is UTD, She is on Trulicity 1.5 milligrams weekly, pioglitazone 45 milligrams daily, metformin 500 milligrams 2 pills a.m., 3 pills evening. She is also on Januvia 100 milligrams daily   Pt has HTN: blood pressure not at goal, has a blood pressure machine at home but has not used it for a while, compliant with medications, follows a low salt diet, exercise as stated above. She is on hydrochlorothiazide 12.5 milligrams daily, losartan 50 milligrams daily.   Review of Systems  Constitutional: Patient is negative for fever, fatigue, weight change.  HEENT: Patient is negative for change in vision, hearing, swallow.  Cardio: Patient is negative for chest pain, lower extremity edema.  Pulmonary: Patient is negative for cough, shortness of breath.  Objective   /70 (BP Location: Left arm, Patient Position: Sitting)   Pulse 94   Temp 36.5 °C (97.7 °F) (Temporal)   Wt 122 kg (270 lb)   SpO2 95%   BMI 39.87 kg/m²     Physical Exam  General: Awake and alert no apparent distress.  HEENT: Moist oral mucosa no cervical lymphadenopathy.  Cardio: Heart S1-S2 no murmur rub or gallop.  Pulmonary: Lungs clear to auscultation bilaterally.  Assessment/Plan   Problem List Items Addressed This Visit             ICD-10-CM    Essential (primary) hypertension   needs better control.  Continue on hydrochlorothiazide 12.5 mg.  Increase losartan from 50 mg tablets once a day to 100 mg tablets once a day.  Patient will follow-up in 6 months. I10    Type 2 diabetes mellitus without complications (Multi) - Primary needs better control.  Will contact the pharmacist regarding availability of  Trulicity or a substitute for it. E11.9

## 2024-08-23 ENCOUNTER — TELEPHONE (OUTPATIENT)
Dept: PRIMARY CARE | Facility: CLINIC | Age: 54
End: 2024-08-23
Payer: COMMERCIAL

## 2024-08-23 DIAGNOSIS — E11.9 TYPE 2 DIABETES MELLITUS WITHOUT COMPLICATION, UNSPECIFIED WHETHER LONG TERM INSULIN USE (MULTI): Primary | ICD-10-CM

## 2024-08-23 DIAGNOSIS — E66.01 MORBID OBESITY (MULTI): ICD-10-CM

## 2024-08-23 NOTE — TELEPHONE ENCOUNTER
----- Message from Ezequiel Burnett sent at 8/23/2024  7:56 AM EDT -----  Regarding: RE: Trulicity  Please call patient to let her know that I have changed her Trulicity to Ozempic.  And I have sent it to her pharmacy, [facility] in [place].  ----- Message -----  From: Tova Elizondo [facility]  Sent: 8/22/2024   8:51 PM EDT  To: Ezequiel Burnett MD  Subject: RE: Trulicity                                    Ozempic is the most available GLP-1 at the present time.  ----- Message -----  From: Ezequiel Burnett MD  Sent: 8/22/2024   5:04 PM EDT  To: Tova Elizondo [facility]  Subject: Trulicity                                        Patient has been on Trulicity but has a hard time getting it over the past 3 months.  Her hemoglobin A1c went from 7.4-11 because of this.  If she able to use another GLP-1 agonist which is more available?

## 2024-08-26 ENCOUNTER — TELEPHONE (OUTPATIENT)
Dept: PRIMARY CARE | Facility: CLINIC | Age: 54
End: 2024-08-26
Payer: COMMERCIAL

## 2024-08-26 NOTE — TELEPHONE ENCOUNTER
Patient called [phone]  her RX  for a toenail fungus is not at [facility] please re-send was seen 8-22-24

## 2024-09-10 RX ORDER — CICLOPIROX 80 MG/ML
SOLUTION TOPICAL
Refills: 2 | Status: CANCELLED | OUTPATIENT
Start: 2024-09-10

## 2024-10-02 DIAGNOSIS — B35.1 NAIL FUNGUS: Primary | ICD-10-CM

## 2024-10-02 RX ORDER — CICLOPIROX 80 MG/ML
SOLUTION TOPICAL NIGHTLY
Qty: 6.6 ML | Refills: 2 | Status: SHIPPED | OUTPATIENT
Start: 2024-10-02

## 2024-10-16 DIAGNOSIS — E11.9 TYPE 2 DIABETES MELLITUS WITHOUT COMPLICATION, UNSPECIFIED WHETHER LONG TERM INSULIN USE (MULTI): ICD-10-CM

## 2024-10-16 DIAGNOSIS — E66.01 MORBID OBESITY (MULTI): ICD-10-CM

## 2024-10-16 NOTE — TELEPHONE ENCOUNTER
Patient wants a refill on Ozempic 1 mg.  Pharmacy is [facility] on [address] in [place].    Patient's number:  [phone]

## 2024-12-10 DIAGNOSIS — E11.9 TYPE 2 DIABETES MELLITUS WITHOUT COMPLICATION, UNSPECIFIED WHETHER LONG TERM INSULIN USE (MULTI): ICD-10-CM

## 2024-12-10 DIAGNOSIS — E66.01 MORBID OBESITY (MULTI): ICD-10-CM

## 2024-12-10 RX ORDER — SEMAGLUTIDE 1.34 MG/ML
1 INJECTION, SOLUTION SUBCUTANEOUS
Qty: 3 ML | Refills: 1 | Status: SHIPPED | OUTPATIENT
Start: 2024-12-10

## 2025-02-04 DIAGNOSIS — E11.9 TYPE 2 DIABETES MELLITUS WITHOUT COMPLICATION, UNSPECIFIED WHETHER LONG TERM INSULIN USE (MULTI): ICD-10-CM

## 2025-02-04 DIAGNOSIS — E66.01 MORBID OBESITY (MULTI): ICD-10-CM

## 2025-02-04 RX ORDER — SEMAGLUTIDE 1.34 MG/ML
1 INJECTION, SOLUTION SUBCUTANEOUS
Qty: 2 ML | Refills: 1 | Status: SHIPPED | OUTPATIENT
Start: 2025-02-04

## 2025-02-06 ENCOUNTER — TELEPHONE (OUTPATIENT)
Dept: PRIMARY CARE | Facility: CLINIC | Age: 55
End: 2025-02-06
Payer: COMMERCIAL

## 2025-02-06 DIAGNOSIS — E78.49 OTHER HYPERLIPIDEMIA: ICD-10-CM

## 2025-02-06 DIAGNOSIS — E11.9 TYPE 2 DIABETES MELLITUS WITHOUT COMPLICATION, UNSPECIFIED WHETHER LONG TERM INSULIN USE (MULTI): ICD-10-CM

## 2025-02-06 DIAGNOSIS — Z00.00 WELL ADULT EXAM: ICD-10-CM

## 2025-02-06 DIAGNOSIS — I10 ESSENTIAL (PRIMARY) HYPERTENSION: ICD-10-CM

## 2025-02-10 ENCOUNTER — TELEPHONE (OUTPATIENT)
Dept: PRIMARY CARE | Facility: CLINIC | Age: 55
End: 2025-02-10
Payer: COMMERCIAL

## 2025-02-10 DIAGNOSIS — E11.9 TYPE 2 DIABETES MELLITUS WITHOUT COMPLICATION, UNSPECIFIED WHETHER LONG TERM INSULIN USE (MULTI): ICD-10-CM

## 2025-02-10 NOTE — TELEPHONE ENCOUNTER
Patient called Rx line and requested a refill for Januvia 100 mg. Last ov 8/22/2024. Has CPE on 2/19/2025.  Pharmacy: [facility] [address]

## 2025-02-11 DIAGNOSIS — E11.9 TYPE 2 DIABETES MELLITUS WITHOUT COMPLICATION, UNSPECIFIED WHETHER LONG TERM INSULIN USE (MULTI): Primary | ICD-10-CM

## 2025-02-11 RX ORDER — SITAGLIPTIN 100 MG/1
100 TABLET ORAL DAILY
Qty: 90 TABLET | Refills: 1 | Status: SHIPPED | OUTPATIENT
Start: 2025-02-11

## 2025-02-12 LAB
ALBUMIN SERPL-MCNC: 4.5 G/DL (ref 3.6–5.1)
ALP SERPL-CCNC: 77 U/L (ref 37–153)
ALT SERPL-CCNC: 15 U/L (ref 6–29)
ANION GAP SERPL CALCULATED.4IONS-SCNC: 9 MMOL/L (CALC) (ref 7–17)
AST SERPL-CCNC: 19 U/L (ref 10–35)
BASOPHILS # BLD AUTO: 8 CELLS/UL (ref 0–200)
BASOPHILS NFR BLD AUTO: 0.1 %
BILIRUB SERPL-MCNC: 0.5 MG/DL (ref 0.2–1.2)
BUN SERPL-MCNC: 15 MG/DL (ref 7–25)
CALCIUM SERPL-MCNC: 10 MG/DL (ref 8.6–10.4)
CHLORIDE SERPL-SCNC: 99 MMOL/L (ref 98–110)
CHOLEST SERPL-MCNC: 190 MG/DL
CHOLEST/HDLC SERPL: 2.8 (CALC)
CO2 SERPL-SCNC: 30 MMOL/L (ref 20–32)
CREAT SERPL-MCNC: 0.72 MG/DL (ref 0.5–1.03)
EGFRCR SERPLBLD CKD-EPI 2021: 98 ML/MIN/1.73M2
EOSINOPHIL # BLD AUTO: 208 CELLS/UL (ref 15–500)
EOSINOPHIL NFR BLD AUTO: 2.6 %
ERYTHROCYTE [DISTWIDTH] IN BLOOD BY AUTOMATED COUNT: 12.7 % (ref 11–15)
EST. AVERAGE GLUCOSE BLD GHB EST-MCNC: 180 MG/DL
EST. AVERAGE GLUCOSE BLD GHB EST-SCNC: 10 MMOL/L
GLUCOSE SERPL-MCNC: 140 MG/DL (ref 65–99)
HBA1C MFR BLD: 7.9 % OF TOTAL HGB
HCT VFR BLD AUTO: 39.6 % (ref 35–45)
HDLC SERPL-MCNC: 67 MG/DL
HGB BLD-MCNC: 12.8 G/DL (ref 11.7–15.5)
LDLC SERPL CALC-MCNC: 97 MG/DL (CALC)
LYMPHOCYTES # BLD AUTO: 1624 CELLS/UL (ref 850–3900)
LYMPHOCYTES NFR BLD AUTO: 20.3 %
MCH RBC QN AUTO: 29 PG (ref 27–33)
MCHC RBC AUTO-ENTMCNC: 32.3 G/DL (ref 32–36)
MCV RBC AUTO: 89.6 FL (ref 80–100)
MONOCYTES # BLD AUTO: 488 CELLS/UL (ref 200–950)
MONOCYTES NFR BLD AUTO: 6.1 %
NEUTROPHILS # BLD AUTO: 5672 CELLS/UL (ref 1500–7800)
NEUTROPHILS NFR BLD AUTO: 70.9 %
NONHDLC SERPL-MCNC: 123 MG/DL (CALC)
PLATELET # BLD AUTO: 221 THOUSAND/UL (ref 140–400)
PMV BLD REES-ECKER: 9.1 FL (ref 7.5–12.5)
POTASSIUM SERPL-SCNC: 4.3 MMOL/L (ref 3.5–5.3)
PROT SERPL-MCNC: 7 G/DL (ref 6.1–8.1)
RBC # BLD AUTO: 4.42 MILLION/UL (ref 3.8–5.1)
SODIUM SERPL-SCNC: 138 MMOL/L (ref 135–146)
TRIGL SERPL-MCNC: 162 MG/DL
WBC # BLD AUTO: 8 THOUSAND/UL (ref 3.8–10.8)

## 2025-02-19 ENCOUNTER — APPOINTMENT (OUTPATIENT)
Dept: PRIMARY CARE | Facility: CLINIC | Age: 55
End: 2025-02-19
Payer: COMMERCIAL

## 2025-02-19 ENCOUNTER — TELEPHONE (OUTPATIENT)
Dept: PRIMARY CARE | Facility: CLINIC | Age: 55
End: 2025-02-19

## 2025-02-19 VITALS
SYSTOLIC BLOOD PRESSURE: 120 MMHG | WEIGHT: 252 LBS | HEIGHT: 69 IN | HEART RATE: 106 BPM | DIASTOLIC BLOOD PRESSURE: 70 MMHG | TEMPERATURE: 96.7 F | OXYGEN SATURATION: 98 % | BODY MASS INDEX: 37.33 KG/M2

## 2025-02-19 DIAGNOSIS — E66.01 MORBID OBESITY (MULTI): ICD-10-CM

## 2025-02-19 DIAGNOSIS — E78.49 OTHER HYPERLIPIDEMIA: ICD-10-CM

## 2025-02-19 DIAGNOSIS — E11.9 TYPE 2 DIABETES MELLITUS WITHOUT COMPLICATION, UNSPECIFIED WHETHER LONG TERM INSULIN USE (MULTI): ICD-10-CM

## 2025-02-19 DIAGNOSIS — I10 ESSENTIAL (PRIMARY) HYPERTENSION: ICD-10-CM

## 2025-02-19 DIAGNOSIS — Z00.00 WELL ADULT EXAM: ICD-10-CM

## 2025-02-19 DIAGNOSIS — Z12.11 COLON CANCER SCREENING: ICD-10-CM

## 2025-02-19 DIAGNOSIS — Z12.31 SCREENING MAMMOGRAM FOR BREAST CANCER: ICD-10-CM

## 2025-02-19 PROCEDURE — 4010F ACE/ARB THERAPY RXD/TAKEN: CPT | Performed by: FAMILY MEDICINE

## 2025-02-19 PROCEDURE — 3074F SYST BP LT 130 MM HG: CPT | Performed by: FAMILY MEDICINE

## 2025-02-19 PROCEDURE — 3078F DIAST BP <80 MM HG: CPT | Performed by: FAMILY MEDICINE

## 2025-02-19 PROCEDURE — 99396 PREV VISIT EST AGE 40-64: CPT | Performed by: FAMILY MEDICINE

## 2025-02-19 PROCEDURE — 1036F TOBACCO NON-USER: CPT | Performed by: FAMILY MEDICINE

## 2025-02-19 PROCEDURE — 3008F BODY MASS INDEX DOCD: CPT | Performed by: FAMILY MEDICINE

## 2025-02-19 PROCEDURE — 93000 ELECTROCARDIOGRAM COMPLETE: CPT | Performed by: FAMILY MEDICINE

## 2025-02-19 RX ORDER — PIOGLITAZONEHYDROCHLORIDE 45 MG/1
45 TABLET ORAL DAILY
Qty: 90 TABLET | Refills: 3 | Status: SHIPPED | OUTPATIENT
Start: 2025-02-19 | End: 2026-02-19

## 2025-02-19 RX ORDER — EZETIMIBE AND SIMVASTATIN 10; 40 MG/1; MG/1
1 TABLET ORAL NIGHTLY
Qty: 90 TABLET | Refills: 3 | Status: SHIPPED | OUTPATIENT
Start: 2025-02-19 | End: 2026-02-19

## 2025-02-19 RX ORDER — HYDROCHLOROTHIAZIDE 12.5 MG/1
12.5 CAPSULE ORAL DAILY
Qty: 90 CAPSULE | Refills: 3 | Status: SHIPPED | OUTPATIENT
Start: 2025-02-19

## 2025-02-19 RX ORDER — LOSARTAN POTASSIUM 100 MG/1
100 TABLET ORAL
Qty: 90 TABLET | Refills: 3 | Status: SHIPPED | OUTPATIENT
Start: 2025-02-19 | End: 2026-02-19

## 2025-02-19 RX ORDER — SEMAGLUTIDE 1.34 MG/ML
1 INJECTION, SOLUTION SUBCUTANEOUS
Qty: 2 ML | Refills: 1 | Status: SHIPPED | OUTPATIENT
Start: 2025-02-23

## 2025-02-19 RX ORDER — METFORMIN HYDROCHLORIDE 500 MG/1
TABLET ORAL
Qty: 450 TABLET | Refills: 3 | Status: SHIPPED | OUTPATIENT
Start: 2025-02-19

## 2025-02-19 ASSESSMENT — PAIN SCALES - GENERAL: PAINLEVEL_OUTOF10: 0-NO PAIN

## 2025-02-19 ASSESSMENT — ENCOUNTER SYMPTOMS
LOSS OF SENSATION IN FEET: 0
DEPRESSION: 0
OCCASIONAL FEELINGS OF UNSTEADINESS: 0

## 2025-02-19 NOTE — PROGRESS NOTES
Subjective   Patient ID: Elyse Dawson is a 54 y.o. female who presents for Annual Exam (EKG done 2/2023/Colonoscopy-needs referral/Mammogram-   needs order/Pap -2/2024  Normal/Tdap  1/2018/Shingrix #1  2/2022  #2  6/2022/Prevnar 20   2/15/2023/Labs  2/11/2025).    HPI   The patient presents for a comprehensive physical exam. PMH, PSH, family history and social history were reviewed and updated.   Patient receives her gynecology care in this office. Her Pap was last done in 2024.  Pt has DM: controlled, HbA1C is 7.9%, urine micro normal, compliant with medications, follows a low carb diet, no exercise routine at this time but plans to start riding a bicycle, DM eye exam is UTD, DM foot exam is UTD (sees podiatry). She is on Ozempic 1 milligrams weekly, pioglitazone 45 milligrams daily, metformin 500 milligrams 2 pills a.m., 3 pills evening. She is also on Zituvio (sitagliptin) 100 milligrams daily.  Pt has HTN: blood pressure not at goal, admits to being flustered today, has a blood pressure machine at home but has not used it for a while, compliant with medications, follows a low salt diet, exercise as stated above. She is on hydrochlorothiazide 12.5 milligrams daily, losartan 100 milligrams daily.  Pt has Hyperlipidemia: controlled, lipid panel normal, compliant with medications, follows a low fat diet, exercise as stated above. She is on Zocor /Zetia 40/10 daily.  Patient is due for colonoscopy.  Patient had her tetanus shot in 2018. She had her shingles immunization in 2022. She states she has had  her Prevnar pneumococcal immunizations in 2023. She has been immunized against coronavirus x3. She refuses influenza immunization.   Patient has never had a colonoscopy.   Patient has never used tobacco and she has less than 1 drink per week.     Review of Systems  GENERAL: denies lack of energy, unexplained weight gain or weight loss, loss of appetite, fever, night sweats.  HEENT: denies difficulty with hearing,  "sinus problems, runny nose, post-nasal drip, ringing in ears, mouth sores, loose teeth, ear pain, nosebleeds, sore throat, facial pain or numbness.  CV: denies irregular heartbeat, racing heart, chest pains, swelling of feet or legs, pain in legs with walking.  RESPIRATORY: denies shortness of breath, prolonged cough, wheezing, sputum production, prior tuberculosis, pleurisy, oxygen at home, coughing up blood.  GI: denies heartburn, constipation, intolerance to certain foods, diarrhea, abdominal pain, difficulty swallowing, nausea, vomiting, blood in stools, unexplained change in bowel habits, incontinence.  : denies painful urination, frequent urination, urgency, bladder problems.  MS: denies aching muscles, swelling of joints, joint deformities, back pain,  INTEGUMENT: denies persistent rash, itching, new skin lesion, change in existing skin lesion, hair loss or increase, breast changes.  NEUROLOGIC: denies frequent headaches, double vision, weakness, change in sensation, problems with walking or balance, dizziness, tremor, loss of consciousness, uncontrolled motions, episodes of visual loss.  PSYCHIATRIC: denies insomnia, irritability, depression, anxiety, recurrent bad thoughts.  ENDOCRINOLOGIC: denies intolerance to heat or cold, menstrual irregularities, frequent hunger/urination/thirst.  HEMATOLOGIC: denies easy bleeding, easy bruising, anemia, leukemia, unexplained swollen areas.  ALLERGIC/IMMUNOLOGIC: denies seasonal allergies, hay fever symptoms, itching, frequent infections.   Objective   /70 (BP Location: Left arm, Patient Position: Sitting)   Pulse 106   Temp 35.9 °C (96.7 °F)   Ht 1.753 m (5' 9\")   Wt 114 kg (252 lb)   SpO2 98%   BMI 37.21 kg/m²     Physical Exam  General appearance: Well developed, obese, in no acute distress.  Skin: Inspection of the skin reveals no rashes, ulceration, or petechiae.  HEENT: The sclerae were anicteric and conjunctivae were pink and moist. Extraocular " movements were intact and pupils were equal, round, and reactive to light with normal accommodation. External inspection of the ears and nose showed no scars, lesions, or masses. EACs clear, TMs translucent, ossicles normal appearance, hearing intact. Nasal mucosa non-inflamed, turbinates normal. Lips, teeth, and gums showed normal mucosa. The oral mucosa, hard and soft palate, tongue and posterior pharynx were normal.  Neck: Supple and symmetric. There was no thyroid enlargement, and no tenderness, or masses were felt.   Lungs: Auscultation of the lungs revealed normal breath sounds without any other adventitious sounds.  Cardiovascular: There was a regular rate and rhythm without any murmurs, gallops, or rubs. There were no carotid bruits. Peripheral pulses were 2+ and symmetric. Brisk capillary refill.  Abdomen: Soft and nontender with normal bowel sounds. The liver was not enlarged or tender. The spleen was not palpable. There was no umbilical hernia noted. No ascites was noted.  Lymph nodes: No lymphadenopathy was appreciated in the neck.  Musculoskeletal: There was no tenderness or effusions noted. Muscle strength and tone were normal.   Extremities: No cyanosis, clubbing, or edema.  Neurologic: Alert and oriented x 3. Normal affect. Normal deep tendon reflexes with no pathological reflexes. Sensation to touch was normal. She declines the influenza vaccination.   Genitourinary: deferred  Rectal: Deferred.  Breasts: Symmetric, no masses palpated.   Assessment/Plan   Problem List Items Addressed This Visit             ICD-10-CM    Essential (primary) hypertension stable.  Continue on hydrochlorothiazide and losartan. I10    Relevant Medications    hydroCHLOROthiazide (Microzide) 12.5 mg capsule    losartan (Cozaar) 100 mg tablet    Other hyperlipidemia stable.  Continue on simvastatin/ezetimibe. E78.49    Relevant Medications    ezetimibe-simvastatin (Vytorin 10-40) 10-40 mg tablet    Type 2 diabetes mellitus  without complications (Multi) needs better control.  Patient will attempt to tighten her diet and increase exercise.  Refilled metformin, pioglitazone, Ozempic. E11.9    Relevant Medications    metFORMIN (Glucophage) 500 mg tablet    pioglitazone (Actos) 45 mg tablet    semaglutide (Ozempic) 1 mg/dose (4 mg/3 mL) pen injector (Start on 2/23/2025)    Other Relevant Orders    Follow Up In Primary Care - Other    Follow Up In Primary Care - Established     Other Visit Diagnoses         Codes    Well adult exam    normal exam. Z00.00    Relevant Orders    ECG 12 Lead (Completed)    Morbid obesity (Multi)    needs better control. E66.01    Relevant Medications    semaglutide (Ozempic) 1 mg/dose (4 mg/3 mL) pen injector (Start on 2/23/2025)    Screening mammogram for breast cancer    normal exam.  Patient given requisition for mammogram. Z12.31    Relevant Orders    BI mammo bilateral screening tomosynthesis    Colon cancer screening    needs referral.  Patient be referred to GI for colonoscopy. Z12.11    Relevant Orders    Referral to Gastroenterology

## 2025-03-31 ENCOUNTER — APPOINTMENT (OUTPATIENT)
Dept: PRIMARY CARE | Facility: CLINIC | Age: 55
End: 2025-03-31
Payer: COMMERCIAL

## 2025-03-31 DIAGNOSIS — E11.9 TYPE 2 DIABETES MELLITUS WITHOUT COMPLICATION, UNSPECIFIED WHETHER LONG TERM INSULIN USE: ICD-10-CM

## 2025-03-31 RX ORDER — TIRZEPATIDE 5 MG/.5ML
5 INJECTION, SOLUTION SUBCUTANEOUS
Qty: 2 ML | Refills: 0 | Status: SHIPPED | OUTPATIENT
Start: 2025-03-31 | End: 2025-04-28

## 2025-03-31 NOTE — PROGRESS NOTES
CGM Personal Start     Elyse Dawson presents to the office for her CGM personal start education and training. Referring Provider: Ezequiel Burnett MD    HEMOGLOBIN A1c (% of total Hgb)   Date Value   02/11/2025 7.9 (H)     Hemoglobin A1C (%)   Date Value   08/09/2024 11.0 (H)   02/02/2024 7.3 (H)   07/21/2023 7.8 (H)   02/01/2023 7.4 (H)   07/19/2022 7.2 (H)     GLUCOSE (mg/dL)   Date Value   02/11/2025 140 (H)     Glucose   Date Value   08/09/2024 272 mg/dL (H)   02/02/2024 152 mg/dL (H)   07/21/2023 179 MG/DL (H)   02/01/2023 157 MG/DL (H)   07/19/2022 151 MG/DL (H)     Creatinine   Date Value   08/09/2024 0.70 mg/dL   02/02/2024 0.70 mg/dL   07/21/2023 0.7 MG/DL   02/01/2023 0.6 MG/DL   07/19/2022 0.8 MG/DL     CREATININE (mg/dL)   Date Value   02/11/2025 0.72     EGFR (mL/min/1.73m2)   Date Value   02/11/2025 98     eGFR (mL/min/1.73m*2)   Date Value   08/09/2024 >90   02/02/2024 >90     ESTIMATED GFR (mL/min/1.73 m2)   Date Value   07/21/2023 102   02/01/2023 107   07/19/2022 88       CURRENT PHARMACOTHERAPY  Medication Reconciliation completed with patient in office today   Current Outpatient Medications on File Prior to Visit   Medication Sig Dispense Refill    ciclopirox (Penlac) 8 % solution Apply topically once daily at bedtime. 6.6 mL 2    ezetimibe-simvastatin (Vytorin 10-40) 10-40 mg tablet Take 1 tablet by mouth once daily at bedtime. 90 tablet 3    hydroCHLOROthiazide (Microzide) 12.5 mg capsule Take 1 capsule (12.5 mg) by mouth once daily. 90 capsule 3    losartan (Cozaar) 100 mg tablet Take 1 tablet (100 mg) by mouth early in the morning.. 90 tablet 3    metFORMIN (Glucophage) 500 mg tablet TAKE 2 TABLETS EVERY MORNING AND 3 TABLETS EVERY EVENING, 450 tablet 3    pioglitazone (Actos) 45 mg tablet Take 1 tablet (45 mg) by mouth once daily. 90 tablet 3    semaglutide (Ozempic) 1 mg/dose (4 mg/3 mL) pen injector Inject 1 mg under the skin 1 (one) time per week. 2 mL 1    Zituvio 100 mg tablet Take  100 mg by mouth once daily. 90 tablet 1     No current facility-administered medications on file prior to visit.       Allergies   Allergen Reactions    Penicillins Swelling    Dapagliflozin Unknown    Erythromycin Ethylsuccinate Unknown         SMBG:  Testing at home? No    CGM:  Device started: ABIGAIL 3 with smartphone     Hypoglycemia tx/sx/prevention:   denies issues with low glucose  Reviewed sx and treatment  Advised to carry glucose source at all times    Assessment/Plan:  Patient received the following instructions for [employer] personal start:   Sensor application and start up of sensor; aware to change in 14 days  Products for better adhesion; skin tac and simpatch  Expected differences in sensor glucose and blood glucose; always check with meter if symptoms do not match sensor glucose or if magnify glass appears on display.   Explanation/importance of trend arrows.   [place] functions: target ranges set to ; alerts set if applicable.   Assisted with creation of [employer] account and connecting to practice.   If applicable, bring reader to all office visits  Remove for MRI, CAT scan and X-ray.   Call [facility] at for problems with sensor, they will replace.   Patient correctly applied sensor to back of upper arm and sensor session started.  Handouts: Abigail 3 Getting Started booklet, sample overbandage, sample Skin Tac     Plan:  Check ephraim often - when you wake up, before/after meals, bedtime, etc.  Follow ephraim closely, learning from glucose readings which meals/snacks cause higher blood sugars.  Call [employer] at [phone] for any problems with sensor readings or adhesion.  Follow up with Clinical pharmacist in 2 weeks.        [employer] 3 personal start education and training provided by SADIQ MELÉNDEZ [facility], [facility]

## 2025-04-17 ENCOUNTER — APPOINTMENT (OUTPATIENT)
Dept: PRIMARY CARE | Facility: CLINIC | Age: 55
End: 2025-04-17
Payer: COMMERCIAL

## 2025-04-17 DIAGNOSIS — E11.9 TYPE 2 DIABETES MELLITUS WITHOUT COMPLICATION, WITHOUT LONG-TERM CURRENT USE OF INSULIN: Primary | ICD-10-CM

## 2025-04-17 RX ORDER — TIRZEPATIDE 7.5 MG/.5ML
7.5 INJECTION, SOLUTION SUBCUTANEOUS
Qty: 2 ML | Refills: 2 | Status: SHIPPED | OUTPATIENT
Start: 2025-04-17 | End: 2025-07-10

## 2025-04-17 RX ORDER — BLOOD-GLUCOSE SENSOR
EACH MISCELLANEOUS
Qty: 2 EACH | Refills: 11 | Status: SHIPPED | OUTPATIENT
Start: 2025-04-17

## 2025-04-17 NOTE — PROGRESS NOTES
DM FOLLOW UP  E11.9 - Type 2 diabetes    Elyse Dawson is a 55 y.o. female here today at the request of Referring Provider: Ezequiel Burnett MD for my opinion regarding diabetes management.  My final recommendations will be communicated back to the requesting provider by way of shared medical record.     Pt here today to review  CGM report and assess dose, weekly GLP-1.  Changed from Ozempic to Mounjaro approx 2 weeks ago.  Pt states she is doing well on Mounjaro, reports more satiety with this medication and less fatigue.      Patient is approved for VAF     Subjective   Past Medical History:  She has no past medical history on file.    Social History:  She reports that she has never smoked. She has never used smokeless tobacco. She reports that she does not drink alcohol and does not use drugs.    Allergies:  Penicillins, Dapagliflozin, and Erythromycin ethylsuccinate    CURRENT PHARMACOTHERAPY   Current Outpatient Medications   Medication Instructions    ciclopirox (Penlac) 8 % solution Topical, Nightly    ezetimibe-simvastatin (Vytorin 10-40) 10-40 mg tablet 1 tablet, oral, Nightly    hydroCHLOROthiazide (MICROZIDE) 12.5 mg, oral, Daily    losartan (COZAAR) 100 mg, oral, Daily (0630)    metFORMIN (Glucophage) 500 mg tablet TAKE 2 TABLETS EVERY MORNING AND 3 TABLETS EVERY EVENING,    Mounjaro 5 mg, subcutaneous, Every 7 days    pioglitazone (ACTOS) 45 mg, oral, Daily     Pt denies SE/intolerances  Reviewed all medications by prescribing practitioner (such as prescriptions, OTCs, herbal therapies, supplements) and documented in the medical record.     Reviewed need for secondary prevention: Statins, ACE-I/ARB, Aspirin    Glucose Monitoring  Patient is using CGM, Teresa.  Report reviewed with patient.  See attached documents.  Name: Elyse Dawson  YOB: 1970  Report Period: 04/02/2025 - 04/15/2025 (14 days)  Generated: 04/17/2025  Time CGM Active: 98%    Glucose Statistics and Targets  Average  Glucose: 139 mg/dL  Glucose Management Indicator (GMI): 6.6%  Glucose Variability (%CV): 18.8%  Target Range: 70 - 180 mg/dL    Time in Ranges  Very High: >250 mg/dL --- 0%  High: 181 - 250 mg/dL --- 5%  Target Range: 70 - 180 mg/dL --- 95%  Low: 54 - 69 mg/dL --- 0%  Very Low: <54 mg/dL --- 0%      Lifestyle:  Current diet: improving, trying to eat smaller portions and improving, trying to limit CHO foods  Current exercise: no regular exercise    Last Labs/Vitals/Meds  HEMOGLOBIN A1c (% of total Hgb)   Date Value   02/11/2025 7.9 (H)     Hemoglobin A1C (%)   Date Value   08/09/2024 11.0 (H)   02/02/2024 7.3 (H)   07/21/2023 7.8 (H)   02/01/2023 7.4 (H)   07/19/2022 7.2 (H)   02/08/2022 7.8 (H)   06/15/2021 7.7 (H)   01/03/2020 7.5 (H)     GLUCOSE (mg/dL)   Date Value   02/11/2025 140 (H)     CREATININE (mg/dL)   Date Value   02/11/2025 0.72     EGFR (mL/min/1.73m2)   Date Value   02/11/2025 98       BP Readings from Last 3 Encounters:   02/19/25 120/70   08/22/24 142/70   02/16/24 126/70        Wt Readings from Last 6 Encounters:   02/19/25 114 kg (252 lb)   08/22/24 122 kg (270 lb)   02/16/24 118 kg (261 lb)   12/05/23 121 kg (267 lb)   11/13/23 119 kg (262 lb)   08/01/23 118 kg (259 lb 12.8 oz)     BMI Readings from Last 6 Encounters:   02/19/25 37.21 kg/m²   08/22/24 39.87 kg/m²   02/16/24 38.54 kg/m²   12/05/23 39.43 kg/m²   11/13/23 38.69 kg/m²   08/01/23 38.37 kg/m²       Assessment:  Patients diabetes is improved with most recent A1c of 7.9% (Goal < 7%).  Was 11.0%.  Compliance at present is estimated to be good  Discussed increase dose, GLP-1 for improved glycemic control and additional wt loss.  Pt is agreeable.   Discussed call office if decreased satiety so that we can discuss increase in GLP-1 dose.        Plan:  1.  START Mounjaro 7.5mg once weekly when finished with current supply   2.  Continue all other medications at current dosages  3.  Follow up in 3 months with clinical pharmacist       Data  reviewed and evaluated by DELIA Elizondo, [facility], [facility]  Treatment and plan changes discussed with Ezequiel Burnett MD

## 2025-04-18 ENCOUNTER — TELEPHONE (OUTPATIENT)
Dept: PRIMARY CARE | Facility: CLINIC | Age: 55
End: 2025-04-18
Payer: COMMERCIAL

## 2025-04-25 ENCOUNTER — OFFICE VISIT (OUTPATIENT)
Dept: PRIMARY CARE | Facility: CLINIC | Age: 55
End: 2025-04-25
Payer: COMMERCIAL

## 2025-04-25 VITALS
TEMPERATURE: 97.7 F | BODY MASS INDEX: 37.66 KG/M2 | WEIGHT: 255 LBS | OXYGEN SATURATION: 94 % | HEART RATE: 109 BPM | DIASTOLIC BLOOD PRESSURE: 62 MMHG | SYSTOLIC BLOOD PRESSURE: 144 MMHG

## 2025-04-25 DIAGNOSIS — R05.1 ACUTE COUGH: Primary | ICD-10-CM

## 2025-04-25 PROCEDURE — 1036F TOBACCO NON-USER: CPT

## 2025-04-25 PROCEDURE — 3077F SYST BP >= 140 MM HG: CPT

## 2025-04-25 PROCEDURE — 99213 OFFICE O/P EST LOW 20 MIN: CPT

## 2025-04-25 PROCEDURE — 4010F ACE/ARB THERAPY RXD/TAKEN: CPT

## 2025-04-25 PROCEDURE — 3078F DIAST BP <80 MM HG: CPT

## 2025-04-25 RX ORDER — DOXYCYCLINE 100 MG/1
100 CAPSULE ORAL 2 TIMES DAILY
Qty: 14 CAPSULE | Refills: 0 | Status: SHIPPED | OUTPATIENT
Start: 2025-04-25 | End: 2025-05-02

## 2025-04-25 RX ORDER — BENZONATATE 100 MG/1
100 CAPSULE ORAL 3 TIMES DAILY PRN
Qty: 42 CAPSULE | Refills: 0 | Status: SHIPPED | OUTPATIENT
Start: 2025-04-25 | End: 2025-05-25

## 2025-04-25 ASSESSMENT — ENCOUNTER SYMPTOMS
FEVER: 0
SHORTNESS OF BREATH: 0
VOMITING: 0
DIZZINESS: 1
HEADACHES: 1
CHILLS: 0
LIGHT-HEADEDNESS: 0
NAUSEA: 0
DIARRHEA: 0
SORE THROAT: 0
RHINORRHEA: 1
COUGH: 1

## 2025-04-25 ASSESSMENT — PATIENT HEALTH QUESTIONNAIRE - PHQ9
2. FEELING DOWN, DEPRESSED OR HOPELESS: NOT AT ALL
1. LITTLE INTEREST OR PLEASURE IN DOING THINGS: NOT AT ALL
SUM OF ALL RESPONSES TO PHQ9 QUESTIONS 1 AND 2: 0

## 2025-04-25 ASSESSMENT — PAIN SCALES - GENERAL: PAINLEVEL_OUTOF10: 0-NO PAIN

## 2025-04-25 NOTE — PROGRESS NOTES
Subjective   Patient ID: Elyse Dawson is a 55 y.o. female who presents for Cough (X 4/20).    Elyse is a 56 yo old female presenting for cough for the last 5 days. Has a long history of sinus issues.   Symptoms include: runny nose, cough - non productive - clear snot, headaches, ears feel full  Patient denies: fevers, NVD, sore throat     OTC: coricidin, delsym cough syrup   Non smoker       Patient Care Team:  Ezequiel Burnett MD as PCP - General (Family Medicine)    PMH, PSH, family history and social history were reviewed and updated.    Review of Systems   Constitutional:  Negative for chills and fever.   HENT:  Positive for congestion, ear pain (Fullness/popping), postnasal drip and rhinorrhea. Negative for ear discharge and sore throat.    Respiratory:  Positive for cough. Negative for shortness of breath.    Cardiovascular:  Negative for chest pain.   Gastrointestinal:  Negative for diarrhea, nausea and vomiting.   Neurological:  Positive for dizziness and headaches. Negative for light-headedness.       Objective   There were no vitals taken for this visit.    Physical Exam  Vitals and nursing note reviewed.   Constitutional:       General: She is not in acute distress.     Appearance: Normal appearance. She is not ill-appearing.   HENT:      Right Ear: Tympanic membrane, ear canal and external ear normal. There is no impacted cerumen.      Left Ear: Tympanic membrane, ear canal and external ear normal. There is no impacted cerumen.   Eyes:      Extraocular Movements: Extraocular movements intact.      Conjunctiva/sclera: Conjunctivae normal.   Cardiovascular:      Rate and Rhythm: Normal rate and regular rhythm.      Heart sounds: Normal heart sounds.   Pulmonary:      Effort: Pulmonary effort is normal. No respiratory distress.      Breath sounds: Normal breath sounds. No stridor. No wheezing, rhonchi or rales.   Chest:      Chest wall: No tenderness.   Skin:     General: Skin is warm and dry.    Neurological:      Mental Status: She is alert and oriented to person, place, and time.   Psychiatric:         Mood and Affect: Mood normal.         Behavior: Behavior normal.         Assessment/Plan   Assessment & Plan  Acute cough    Orders:    doxycycline (Vibramycin) 100 mg capsule; Take 1 capsule (100 mg) by mouth 2 times a day for 7 days. Take with at least 8 ounces (large glass) of water, do not lie down for 30 minutes after    benzonatate (Tessalon) 100 mg capsule; Take 1 capsule (100 mg) by mouth 3 times a day as needed for cough. Do not crush or chew.    Please take entire prescription, take with food   Will try tessalon pearls     Educated with supportive care of increased fluids, rest, nasal saline 4x a day, Mucinex, +/- anti-histamine and Flonase.  Continue any OTC medications that are helping     Follow up 1 Week if no improvement/worsening in symptoms, sooner with any problems or concerns.

## 2025-06-09 ENCOUNTER — HOSPITAL ENCOUNTER (OUTPATIENT)
Dept: RADIOLOGY | Facility: CLINIC | Age: 55
Discharge: HOME | End: 2025-06-09
Payer: COMMERCIAL

## 2025-06-09 VITALS — BODY MASS INDEX: 37.03 KG/M2 | HEIGHT: 69 IN | WEIGHT: 250 LBS

## 2025-06-09 DIAGNOSIS — Z12.31 SCREENING MAMMOGRAM FOR BREAST CANCER: ICD-10-CM

## 2025-06-09 PROCEDURE — 77063 BREAST TOMOSYNTHESIS BI: CPT | Performed by: STUDENT IN AN ORGANIZED HEALTH CARE EDUCATION/TRAINING PROGRAM

## 2025-06-09 PROCEDURE — 77067 SCR MAMMO BI INCL CAD: CPT

## 2025-06-09 PROCEDURE — 77067 SCR MAMMO BI INCL CAD: CPT | Performed by: STUDENT IN AN ORGANIZED HEALTH CARE EDUCATION/TRAINING PROGRAM

## 2025-07-17 ENCOUNTER — APPOINTMENT (OUTPATIENT)
Dept: PRIMARY CARE | Facility: CLINIC | Age: 55
End: 2025-07-17
Payer: COMMERCIAL

## 2025-07-20 DIAGNOSIS — E11.9 TYPE 2 DIABETES MELLITUS WITHOUT COMPLICATION, UNSPECIFIED WHETHER LONG TERM INSULIN USE: ICD-10-CM

## 2025-07-28 ENCOUNTER — TELEPHONE (OUTPATIENT)
Dept: PRIMARY CARE | Facility: CLINIC | Age: 55
End: 2025-07-28
Payer: COMMERCIAL

## 2025-07-28 DIAGNOSIS — E11.9 TYPE 2 DIABETES MELLITUS WITHOUT COMPLICATION, WITHOUT LONG-TERM CURRENT USE OF INSULIN: Primary | ICD-10-CM

## 2025-07-28 RX ORDER — TIRZEPATIDE 7.5 MG/.5ML
7.5 INJECTION, SOLUTION SUBCUTANEOUS
Qty: 2 ML | Refills: 0 | Status: SHIPPED | OUTPATIENT
Start: 2025-07-28 | End: 2025-08-25

## 2025-07-28 NOTE — TELEPHONE ENCOUNTER
Patient is calling in for a refill on Mounjaro 7.5 mg tabs.  Last seen 02/19/25 scheduled 08/20/25 please send to [facility] on [address].

## 2025-08-08 LAB
ALBUMIN SERPL-MCNC: 4.2 G/DL (ref 3.6–5.1)
ALP SERPL-CCNC: 68 U/L (ref 37–153)
ALT SERPL-CCNC: 11 U/L (ref 6–29)
ANION GAP SERPL CALCULATED.4IONS-SCNC: 10 MMOL/L (CALC) (ref 7–17)
AST SERPL-CCNC: 15 U/L (ref 10–35)
BILIRUB SERPL-MCNC: 0.4 MG/DL (ref 0.2–1.2)
BUN SERPL-MCNC: 16 MG/DL (ref 7–25)
CALCIUM SERPL-MCNC: 10.1 MG/DL (ref 8.6–10.4)
CHLORIDE SERPL-SCNC: 102 MMOL/L (ref 98–110)
CO2 SERPL-SCNC: 27 MMOL/L (ref 20–32)
CREAT SERPL-MCNC: 0.72 MG/DL (ref 0.5–1.03)
EGFRCR SERPLBLD CKD-EPI 2021: 97 ML/MIN/1.73M2
EST. AVERAGE GLUCOSE BLD GHB EST-MCNC: 154 MG/DL
EST. AVERAGE GLUCOSE BLD GHB EST-SCNC: 8.5 MMOL/L
GLUCOSE SERPL-MCNC: 136 MG/DL (ref 65–99)
HBA1C MFR BLD: 7 %
POTASSIUM SERPL-SCNC: 4.5 MMOL/L (ref 3.5–5.3)
PROT SERPL-MCNC: 6.7 G/DL (ref 6.1–8.1)
SODIUM SERPL-SCNC: 139 MMOL/L (ref 135–146)

## 2025-08-20 ENCOUNTER — APPOINTMENT (OUTPATIENT)
Dept: PRIMARY CARE | Facility: CLINIC | Age: 55
End: 2025-08-20
Payer: COMMERCIAL

## 2025-08-20 ENCOUNTER — TELEPHONE (OUTPATIENT)
Dept: PRIMARY CARE | Facility: CLINIC | Age: 55
End: 2025-08-20

## 2025-08-20 VITALS
BODY MASS INDEX: 36.14 KG/M2 | WEIGHT: 244 LBS | DIASTOLIC BLOOD PRESSURE: 74 MMHG | HEART RATE: 79 BPM | SYSTOLIC BLOOD PRESSURE: 118 MMHG | HEIGHT: 69 IN | TEMPERATURE: 96.4 F

## 2025-08-20 DIAGNOSIS — E11.9 TYPE 2 DIABETES MELLITUS WITHOUT COMPLICATION, UNSPECIFIED WHETHER LONG TERM INSULIN USE: ICD-10-CM

## 2025-08-20 DIAGNOSIS — M67.40 GANGLION CYST: Primary | ICD-10-CM

## 2025-08-20 DIAGNOSIS — I10 ESSENTIAL (PRIMARY) HYPERTENSION: ICD-10-CM

## 2025-08-20 PROCEDURE — 99214 OFFICE O/P EST MOD 30 MIN: CPT | Performed by: FAMILY MEDICINE

## 2025-08-20 PROCEDURE — 3078F DIAST BP <80 MM HG: CPT | Performed by: FAMILY MEDICINE

## 2025-08-20 PROCEDURE — 1036F TOBACCO NON-USER: CPT | Performed by: FAMILY MEDICINE

## 2025-08-20 PROCEDURE — 3074F SYST BP LT 130 MM HG: CPT | Performed by: FAMILY MEDICINE

## 2025-08-20 PROCEDURE — 3008F BODY MASS INDEX DOCD: CPT | Performed by: FAMILY MEDICINE

## 2025-08-20 PROCEDURE — 4010F ACE/ARB THERAPY RXD/TAKEN: CPT | Performed by: FAMILY MEDICINE

## 2025-08-20 RX ORDER — TIRZEPATIDE 10 MG/.5ML
10 INJECTION, SOLUTION SUBCUTANEOUS WEEKLY
Qty: 6 ML | Refills: 1 | Status: SHIPPED | OUTPATIENT
Start: 2025-08-20

## 2025-08-20 ASSESSMENT — PATIENT HEALTH QUESTIONNAIRE - PHQ9
SUM OF ALL RESPONSES TO PHQ9 QUESTIONS 1 AND 2: 0
1. LITTLE INTEREST OR PLEASURE IN DOING THINGS: NOT AT ALL
2. FEELING DOWN, DEPRESSED OR HOPELESS: NOT AT ALL

## 2025-08-20 ASSESSMENT — PAIN SCALES - GENERAL: PAINLEVEL_OUTOF10: 0-NO PAIN

## 2025-11-26 ENCOUNTER — APPOINTMENT (OUTPATIENT)
Dept: PRIMARY CARE | Facility: CLINIC | Age: 55
End: 2025-11-26
Payer: COMMERCIAL